# Patient Record
Sex: MALE | Race: WHITE | Employment: FULL TIME | URBAN - METROPOLITAN AREA
[De-identification: names, ages, dates, MRNs, and addresses within clinical notes are randomized per-mention and may not be internally consistent; named-entity substitution may affect disease eponyms.]

---

## 2023-03-17 ENCOUNTER — HOSPITAL ENCOUNTER (EMERGENCY)
Age: 43
Discharge: HOME | End: 2023-03-17
Payer: COMMERCIAL

## 2023-03-17 ENCOUNTER — APPOINTMENT (OUTPATIENT)
Dept: GENERAL RADIOLOGY | Age: 43
End: 2023-03-17
Payer: COMMERCIAL

## 2023-03-17 VITALS
DIASTOLIC BLOOD PRESSURE: 90 MMHG | OXYGEN SATURATION: 100 % | BODY MASS INDEX: 25.18 KG/M2 | HEART RATE: 65 BPM | HEIGHT: 73 IN | TEMPERATURE: 97.5 F | WEIGHT: 190 LBS | SYSTOLIC BLOOD PRESSURE: 128 MMHG | RESPIRATION RATE: 16 BRPM

## 2023-03-17 DIAGNOSIS — S61.442A PUNCTURE WOUND OF LEFT HAND WITH FOREIGN BODY, INITIAL ENCOUNTER: Primary | ICD-10-CM

## 2023-03-17 PROCEDURE — 6360000002 HC RX W HCPCS: Performed by: PHYSICIAN ASSISTANT

## 2023-03-17 PROCEDURE — 90471 IMMUNIZATION ADMIN: CPT | Performed by: PHYSICIAN ASSISTANT

## 2023-03-17 PROCEDURE — 6370000000 HC RX 637 (ALT 250 FOR IP): Performed by: PHYSICIAN ASSISTANT

## 2023-03-17 PROCEDURE — 73130 X-RAY EXAM OF HAND: CPT

## 2023-03-17 PROCEDURE — 90715 TDAP VACCINE 7 YRS/> IM: CPT | Performed by: PHYSICIAN ASSISTANT

## 2023-03-17 PROCEDURE — 99284 EMERGENCY DEPT VISIT MOD MDM: CPT

## 2023-03-17 RX ORDER — ONDANSETRON 4 MG/1
4 TABLET, ORALLY DISINTEGRATING ORAL ONCE
Status: COMPLETED | OUTPATIENT
Start: 2023-03-17 | End: 2023-03-17

## 2023-03-17 RX ORDER — CEPHALEXIN 250 MG/1
500 CAPSULE ORAL ONCE
Status: COMPLETED | OUTPATIENT
Start: 2023-03-17 | End: 2023-03-17

## 2023-03-17 RX ORDER — CEPHALEXIN 500 MG/1
500 CAPSULE ORAL 4 TIMES DAILY
Qty: 12 CAPSULE | Refills: 0 | Status: SHIPPED | OUTPATIENT
Start: 2023-03-17 | End: 2023-03-20

## 2023-03-17 RX ADMIN — TETANUS TOXOID, REDUCED DIPHTHERIA TOXOID AND ACELLULAR PERTUSSIS VACCINE, ADSORBED 0.5 ML: 5; 2.5; 8; 8; 2.5 SUSPENSION INTRAMUSCULAR at 17:00

## 2023-03-17 RX ADMIN — ONDANSETRON 4 MG: 4 TABLET, ORALLY DISINTEGRATING ORAL at 17:00

## 2023-03-17 RX ADMIN — CEPHALEXIN 500 MG: 250 CAPSULE ORAL at 17:00

## 2023-03-17 ASSESSMENT — PAIN SCALES - GENERAL: PAINLEVEL_OUTOF10: 6

## 2023-03-17 ASSESSMENT — PAIN DESCRIPTION - LOCATION: LOCATION: HAND

## 2023-03-17 ASSESSMENT — PAIN DESCRIPTION - DESCRIPTORS: DESCRIPTORS: ACHING

## 2023-03-17 ASSESSMENT — PAIN DESCRIPTION - PAIN TYPE: TYPE: ACUTE PAIN

## 2023-03-17 ASSESSMENT — PAIN - FUNCTIONAL ASSESSMENT
PAIN_FUNCTIONAL_ASSESSMENT: 0-10
PAIN_FUNCTIONAL_ASSESSMENT: NONE - DENIES PAIN

## 2023-03-17 ASSESSMENT — PAIN DESCRIPTION - ORIENTATION: ORIENTATION: LEFT

## 2023-03-17 NOTE — ED PROVIDER NOTES
[facility]  ED  EMERGENCY DEPARTMENT ENCOUNTER        Pt Name: Hodan uZñiga  MRN: [id number]  [place] 1980  Date of evaluation: 3/17/2023  Provider: SALVATORE Caraballo  PCP: No primary care provider on file. Note Started: 3:51 PM EDT       FREDY. I have evaluated this patient. My supervising physician was available for consultation. CHIEF COMPLAINT       Chief Complaint   Patient presents with    Foreign Body     Screw in palm of left hand. States hand slipped while using drill on board. Last tetanus unknown              HISTORY OF PRESENT ILLNESS      Chief Complaint: Screw to left palm    Hodan Zuñiga is a 43 y.o. male who presents to the emergency room for evaluation of screw in his left palm. Patient was distantly furniture, when the drill slipped and dropped the screw into his hand. He attempted to unscrew it, but was unsuccessful. In the emergency room he reports tingling in his fingers, as well as feeling nauseous. He reports pain, worse with motion better with rest.  Tetanus unknown. SCREENINGS    [place] Coma Scale  Eye Opening: Spontaneous  Best Verbal Response: Oriented  Best Motor Response: Obeys commands  [place] Coma Scale Score: 15      Is this patient to be included in the SEP-1 Core Measure due to severe sepsis or septic shock? No   Exclusion criteria - the patient is NOT to be included for SEP-1 Core Measure due to: Infection is not suspected      PHYSICAL EXAM     Vitals: BP (!) 128/90   Pulse 65   Temp 97.5 °F (36.4 °C) (Oral)   Resp 16   Ht 6' 1\" (1.854 m)   Wt 190 lb (86.2 kg)   SpO2 100%   BMI 25.07 kg/m²    General: awake, alert, no apparent distress  Pupils: equal, reactive  Head: Non-traumatic  Heart: Rate as noted, regular rhythm, no murmur or rubs. Chest/Lungs: CTAB, no wheezes or crackles  Abdomen: soft, nondistended, no tenderness to palpation   Left upper extremity: Sensation intact medial radial ulnar.   Screw visible on palm of left hand. Screw moves with finger flexion. Cap refill less than 2. Reexam: Sensation intact to light touch ulnar radial digital nerves all 5 fingers. Full fist thumbs up cross fingers fully extend okay sign. Neuro: no facial droop, no slurred speech, answers questions appropriately. Skin: Warm. No visible rash, lesions, or bruising       DIAGNOSTIC RESULTS   LABS:    Labs Reviewed - No data to display    EKG: When ordered, EKG's are interpreted by the Emergency Department Physician in the absence of a cardiologist.  Please see their note for interpretation of EKG. RADIOLOGY:   XR HAND LEFT (MIN 3 VIEWS)   Final Result   No acute osseous abnormality evident. Screw in the palmar soft tissues of the hand, screw tip just barely touching   the palmar cortical surface of the base of the 4th metacarpal bone. No results found. No results found. PROCEDURES   Foreign Body    Date/Time: 3/17/2023 9:42 PM  Performed by: SALVATORE Best  Authorized by: SALVATORE Best     Consent:     Consent obtained:  Verbal    Consent given by:  Patient    Risks, benefits, and alternatives were discussed: yes      Risks discussed:  Bleeding, infection and pain    Alternatives discussed:  No treatment  Universal protocol:     Patient identity confirmed:  Verbally with patient  Location:     Location:  Hand    Hand location:  L palm    Depth:  Tendon sheath    Tendon involvement:  Superficial  Pre-procedure details:     Imaging:  X-ray    Neurovascular status: intact    Anesthesia:     Anesthesia method:  Local infiltration    Local anesthetic:  Lidocaine 2% w/o epi  Procedure type:     Procedure complexity:  Simple  Procedure details:     Removal mechanism: Gloved hand.     Foreign bodies recovered:  1    Description:  Screw    Intact foreign body removal: yes    Post-procedure details:     Neurovascular status: intact      Confirmation:  No additional foreign bodies on visualization    Skin closure: None    Dressing:  Open (no dressing)    Procedure completion:  Tolerated well, no immediate complications      CRITICAL CARE TIME   I personally saw the patient and independently provided 0 minutes of non-concurrent critical care time out of the total critical care time provided. This excludes time spent doing separately billable procedures. This includes time at the bedside, data interpretation, medication management, obtaining critical history from collateral sources if the patient is unable to provide it directly, and physician consultation. Specifics of interventions taken and potentially life-threatening diagnostic considerations are listed above in the medical decision making. CONSULTS   IP CONSULT TO ORTHOPEDIC SURGERY    ED COURSE and MEDICAL DECISIONS MAKING:   Vitals:    Vitals:    03/17/23 1542   BP: (!) 128/90   Pulse: 65   Resp: 16   Temp: 97.5 °F (36.4 °C)   TempSrc: Oral   SpO2: 100%   Weight: 190 lb (86.2 kg)   Height: 6' 1\" (1.854 m)     MEDICATIONS:  Medications   ondansetron (ZOFRAN-ODT) disintegrating tablet 4 mg (4 mg Oral Given 3/17/23 1700)   tetanus-diphth-acell pertussis (BOOSTRIX) injection 0.5 mL (0.5 mLs IntraMUSCular Given 3/17/23 1700)   cephALEXin (KEFLEX) capsule 500 mg (500 mg Oral Given 3/17/23 1700)           26-year-old male presents to the emergency room for evaluation of screw in his left palm. I was able to remove the screw using my hand. Screw removed intact. Neurovascularly intact both before and after the procedure. He does not have any deficits on hand exam, including full range of motion. X-ray does not demonstrate fracture or osseous abnormality. Given first dose of cephalexin here in the emergency department. Discussed with orthopedics, they do not feel as though a splint is required. Because there was a tendinous involvement they request that patient follow-up with hand surgery.   Discussed with patient, he knows to return to the ED should symptoms change or worsen. He will take his antibiotics. He will follow-up with hand surgery next week for reevaluation. FINAL IMPRESSION      1.  Puncture wound of left hand with foreign body, initial encounter          DISPOSITION/PLAN   DISPOSITION Decision To Discharge 03/17/2023 04:45:48 PM      PATIENT REFERRED TO:  Andrea Trevino MD  [phone]  Schedule an appointment as soon as possible for a visit in 1 week  For wound re-check    Horacio Gallegos MD  [address] [place] [id number]:  Discharge Medication List as of 3/17/2023  5:04 PM        START taking these medications    Details   cephALEXin (KEFLEX) 500 MG capsule Take 1 capsule by mouth 4 times daily for 3 days, Disp-12 capsule, R-0Normal             SALVATORE Phillips (electronically signed)        SALVATORE Giang  03/17/23 [id number]

## 2023-03-17 NOTE — DISCHARGE INSTRUCTIONS
-Follow up with hand surgery.   -Take antibiotics. -Take 400mg Ibuprofen (Motrin) and 500mg Acetaminophen (Tylenol) every 4 hours for pain.   -Come back if you feel worse. -Move your hand at least every hour to prevent stiffness.

## 2023-04-20 ENCOUNTER — TELEPHONE (OUTPATIENT)
Dept: ORTHOPEDIC SURGERY | Age: 43
End: 2023-04-20

## 2023-04-20 NOTE — TELEPHONE ENCOUNTER
Did leave message regarding ED ref for a f/u appointment. Upon return call please schedule with Dr Jose Moon.